# Patient Record
Sex: FEMALE | Race: WHITE | NOT HISPANIC OR LATINO | Employment: FULL TIME | URBAN - METROPOLITAN AREA
[De-identification: names, ages, dates, MRNs, and addresses within clinical notes are randomized per-mention and may not be internally consistent; named-entity substitution may affect disease eponyms.]

---

## 2019-10-17 DIAGNOSIS — R51.9 NONINTRACTABLE HEADACHE, UNSPECIFIED CHRONICITY PATTERN, UNSPECIFIED HEADACHE TYPE: Primary | ICD-10-CM

## 2019-10-17 RX ORDER — KETOROLAC TROMETHAMINE 15.75 MG/1
15.75 SPRAY, METERED NASAL EVERY 6 HOURS
Qty: 5 EACH | Refills: 0 | Status: SHIPPED | OUTPATIENT
Start: 2019-10-17 | End: 2019-12-31

## 2019-12-31 ENCOUNTER — OFFICE VISIT (OUTPATIENT)
Dept: FAMILY MEDICINE | Facility: CLINIC | Age: 39
End: 2019-12-31
Payer: COMMERCIAL

## 2019-12-31 VITALS
SYSTOLIC BLOOD PRESSURE: 118 MMHG | HEIGHT: 67 IN | OXYGEN SATURATION: 99 % | HEART RATE: 74 BPM | BODY MASS INDEX: 25.25 KG/M2 | WEIGHT: 160.88 LBS | DIASTOLIC BLOOD PRESSURE: 78 MMHG

## 2019-12-31 DIAGNOSIS — Z00.00 PREVENTATIVE HEALTH CARE: Primary | ICD-10-CM

## 2019-12-31 PROCEDURE — 99385 PREV VISIT NEW AGE 18-39: CPT | Mod: S$GLB,,, | Performed by: FAMILY MEDICINE

## 2019-12-31 PROCEDURE — 99385 PR PREVENTIVE VISIT,NEW,18-39: ICD-10-PCS | Mod: S$GLB,,, | Performed by: FAMILY MEDICINE

## 2019-12-31 RX ORDER — BUPROPION HYDROCHLORIDE 150 MG/1
150 TABLET ORAL DAILY
Qty: 30 TABLET | Refills: 1 | Status: SHIPPED | OUTPATIENT
Start: 2019-12-31 | End: 2020-01-23

## 2019-12-31 NOTE — PROGRESS NOTES
Subjective:       Patient ID: Ammy Zamora is a 39 y.o. female.    Chief Complaint: Establish Care (Wellness)    HPI     Here to est care and for wellness. utd pap and vacs. No complaints at this time. Needs annual labs.     Review of Systems   Constitutional: Negative for chills, fatigue and fever.   Eyes: Negative for visual disturbance.   Respiratory: Negative for cough and shortness of breath.    Cardiovascular: Negative for chest pain and palpitations.   Gastrointestinal: Negative for abdominal pain, nausea and vomiting.   Genitourinary: Negative for dysuria and frequency.   Musculoskeletal: Negative for arthralgias and myalgias.   Skin: Negative for rash.   Neurological: Negative for dizziness, weakness, light-headedness and headaches.       Objective:      Physical Exam   Constitutional: She is oriented to person, place, and time. She appears well-developed and well-nourished. No distress.   HENT:   Head: Normocephalic and atraumatic.   Eyes: Conjunctivae are normal.   Neck: Normal range of motion. Neck supple.   Cardiovascular: Normal rate, regular rhythm and normal heart sounds. Exam reveals no gallop and no friction rub.   No murmur heard.  Pulmonary/Chest: Effort normal and breath sounds normal. No stridor. No respiratory distress. She has no wheezes.   Musculoskeletal: She exhibits no edema.   Neurological: She is alert and oriented to person, place, and time.   Skin: Skin is warm and dry. She is not diaphoretic. No pallor.   Psychiatric: She has a normal mood and affect. Her behavior is normal.   Vitals reviewed.      Assessment:       1. Preventative health care        Plan:       Ammy was seen today for [facility].    Diagnoses and all orders for this visit:    Preventative health care  -     CBC auto differential; Future  -     Comprehensive metabolic panel; Future  -     TSH; Future  -     CBC auto differential  -     Comprehensive metabolic panel  -     TSH    Other orders  -      buPROPion (WELLBUTRIN XL) 150 MG TB24 tablet; Take 1 tablet (150 mg total) by mouth once daily.

## 2020-01-23 DIAGNOSIS — Z00.00 PREVENTATIVE HEALTH CARE: Primary | ICD-10-CM

## 2020-01-23 DIAGNOSIS — N97.9 INFERTILITY, FEMALE: ICD-10-CM

## 2020-01-23 RX ORDER — BUPROPION HYDROCHLORIDE 300 MG/1
300 TABLET ORAL DAILY
Qty: 30 TABLET | Refills: 2 | Status: SHIPPED | OUTPATIENT
Start: 2020-01-23 | End: 2020-04-14

## 2020-01-23 RX ORDER — BUPROPION HYDROCHLORIDE 150 MG/1
TABLET ORAL
Qty: 30 TABLET | Refills: 1 | OUTPATIENT
Start: 2020-01-23

## 2020-01-23 RX ORDER — LETROZOLE 2.5 MG/1
TABLET, FILM COATED ORAL
Qty: 10 TABLET | Refills: 0 | Status: SHIPPED | OUTPATIENT
Start: 2020-01-23 | End: 2020-03-18 | Stop reason: ALTCHOICE

## 2020-02-14 DIAGNOSIS — N97.9 INFERTILITY, FEMALE: ICD-10-CM

## 2020-02-14 RX ORDER — LETROZOLE 2.5 MG/1
TABLET, FILM COATED ORAL
Refills: 0 | OUTPATIENT
Start: 2020-02-14

## 2020-03-18 RX ORDER — AZITHROMYCIN 250 MG/1
250 TABLET, FILM COATED ORAL DAILY
Qty: 5 TABLET | Refills: 0 | Status: SHIPPED | OUTPATIENT
Start: 2020-03-18 | End: 2020-03-23

## 2020-03-18 RX ORDER — HYDROXYCHLOROQUINE SULFATE 200 MG/1
200 TABLET, FILM COATED ORAL 2 TIMES DAILY
Qty: 60 TABLET | Refills: 0 | Status: SHIPPED | OUTPATIENT
Start: 2020-03-18 | End: 2020-04-17

## 2020-04-13 RX ORDER — HYDROXYCHLOROQUINE SULFATE 200 MG/1
TABLET, FILM COATED ORAL
Qty: 60 TABLET | Refills: 0 | OUTPATIENT
Start: 2020-04-13

## 2020-04-14 DIAGNOSIS — Z00.00 PREVENTATIVE HEALTH CARE: ICD-10-CM

## 2020-04-14 RX ORDER — BUPROPION HYDROCHLORIDE 300 MG/1
TABLET ORAL
Qty: 90 TABLET | Refills: 0 | Status: SHIPPED | OUTPATIENT
Start: 2020-04-14 | End: 2021-05-10

## 2020-04-21 DIAGNOSIS — Z01.84 ANTIBODY RESPONSE EXAMINATION: ICD-10-CM

## 2020-04-23 DIAGNOSIS — Z34.90 EARLY STAGE OF PREGNANCY: Primary | ICD-10-CM

## 2020-04-23 LAB — B-HCG SERPL-ACNC: 3623 MIU/ML

## 2020-04-27 DIAGNOSIS — Z34.90 EARLY STAGE OF PREGNANCY: Primary | ICD-10-CM

## 2020-04-27 RX ORDER — PROGESTERONE 200 MG/1
200 CAPSULE ORAL NIGHTLY
Qty: 12 CAPSULE | Refills: 11 | Status: SHIPPED | OUTPATIENT
Start: 2020-04-27 | End: 2021-05-10

## 2020-04-30 ENCOUNTER — LAB VISIT (OUTPATIENT)
Dept: FAMILY MEDICINE | Facility: CLINIC | Age: 40
End: 2020-04-30
Payer: COMMERCIAL

## 2020-04-30 DIAGNOSIS — Z01.84 ANTIBODY RESPONSE EXAMINATION: ICD-10-CM

## 2020-04-30 PROCEDURE — 86769 SARS-COV-2 COVID-19 ANTIBODY: CPT

## 2020-05-01 LAB — SARS-COV-2 IGG SERPLBLD QL IA.RAPID: NEGATIVE

## 2020-05-05 ENCOUNTER — PROCEDURE VISIT (OUTPATIENT)
Dept: OBSTETRICS AND GYNECOLOGY | Facility: CLINIC | Age: 40
End: 2020-05-05
Payer: COMMERCIAL

## 2020-05-05 DIAGNOSIS — Z34.81 ENCOUNTER FOR SUPERVISION OF NORMAL PREGNANCY IN MULTIGRAVIDA IN FIRST TRIMESTER: ICD-10-CM

## 2020-05-05 DIAGNOSIS — Z34.81 ENCOUNTER FOR SUPERVISION OF NORMAL PREGNANCY IN MULTIGRAVIDA IN FIRST TRIMESTER: Primary | ICD-10-CM

## 2020-05-05 PROCEDURE — 76817 PR US, OB, TRANSVAG APPROACH: ICD-10-PCS | Mod: S$GLB,,, | Performed by: OBSTETRICS & GYNECOLOGY

## 2020-05-05 PROCEDURE — 76817 TRANSVAGINAL US OBSTETRIC: CPT | Mod: S$GLB,,, | Performed by: OBSTETRICS & GYNECOLOGY

## 2020-05-11 RX ORDER — HYDROXYCHLOROQUINE SULFATE 200 MG/1
TABLET, FILM COATED ORAL
Qty: 60 TABLET | Refills: 0 | OUTPATIENT
Start: 2020-05-11

## 2020-05-12 LAB
ABS NRBC COUNT: 0 X 10 3/UL (ref 0–0.01)
ABSOLUTE BASOPHIL: 0.01 X 10 3/UL (ref 0–0.22)
ABSOLUTE EOSINOPHIL: 0.08 X 10 3/UL (ref 0.04–0.54)
ABSOLUTE IMMATURE GRAN: 0.02 X 10 3/UL (ref 0–0.04)
ABSOLUTE LYMPHOCYTE: 1.47 X 10 3/UL (ref 0.86–4.75)
ABSOLUTE MONOCYTE: 0.32 X 10 3/UL (ref 0.22–1.08)
ANTIBODY SCREEN: NEGATIVE
BASOPHILS NFR BLD: 0.2 % (ref 0.2–1.2)
BLOOD GROUPING: NORMAL
BLOOD TYPE (D): POSITIVE
EOSINOPHIL NFR BLD: 1.5 % (ref 0.7–7)
HBV SURFACE AG SERPL QL IA: NONREACTIVE
HCT VFR BLD AUTO: 35 % (ref 37–47)
HGB BLD-MCNC: 11.8 G/DL (ref 12–16)
HIV 1+2 AB+HIV1 P24 AG SERPL QL IA: NONREACTIVE
IMMATURE GRANULOCYTES: 0.4 % (ref 0–0.5)
LYMPHOCYTES NFR BLD: 26.8 % (ref 19.3–53.1)
MCH RBC QN AUTO: 31.2 PG (ref 27–32)
MCHC RBC AUTO-ENTMCNC: 33.7 G/DL (ref 32–36)
MCV RBC AUTO: 92.6 FL (ref 82–100)
MONOCYTES NFR BLD: 5.8 % (ref 4.7–12.5)
NEUTROPHILS ABSOLUTE COUNT: 3.59 X 10 3/UL (ref 2.15–7.56)
NEUTROPHILS NFR BLD: 65.3 %
NUCLEATED RED BLOOD CELLS: 0 /100 WBC (ref 0–0.2)
PLATELET # BLD AUTO: 194 X 10 3/UL (ref 135–400)
RBC # BLD AUTO: 3.78 X 10 6/UL (ref 4.2–5.4)
RDW-SD: 40.2 FL (ref 37–54)
RUBELLA IGG SCREEN: NORMAL
SICKLE CELL PREP: NEGATIVE
SYPHILIS TREPONEMAL ANTIBODY: NONREACTIVE
WBC # BLD: 5.49 X 10 3/UL (ref 4.3–10.8)

## 2020-06-01 ENCOUNTER — ROUTINE PRENATAL (OUTPATIENT)
Dept: OBSTETRICS AND GYNECOLOGY | Facility: CLINIC | Age: 40
End: 2020-06-01
Payer: COMMERCIAL

## 2020-06-01 VITALS
DIASTOLIC BLOOD PRESSURE: 79 MMHG | HEART RATE: 90 BPM | WEIGHT: 160 LBS | BODY MASS INDEX: 25.06 KG/M2 | SYSTOLIC BLOOD PRESSURE: 128 MMHG

## 2020-06-01 DIAGNOSIS — Z34.01 ENCOUNTER FOR SUPERVISION OF NORMAL FIRST PREGNANCY IN FIRST TRIMESTER: Primary | ICD-10-CM

## 2020-06-01 DIAGNOSIS — O09.521 MULTIGRAVIDA OF ADVANCED MATERNAL AGE IN FIRST TRIMESTER: ICD-10-CM

## 2020-06-01 DIAGNOSIS — Z34.81 ENCOUNTER FOR SUPERVISION OF NORMAL PREGNANCY IN MULTIGRAVIDA IN FIRST TRIMESTER: ICD-10-CM

## 2020-06-01 PROCEDURE — 0500F INITIAL PRENATAL CARE VISIT: CPT | Mod: S$GLB,,, | Performed by: OBSTETRICS & GYNECOLOGY

## 2020-06-01 PROCEDURE — 0500F PR INITIAL PRENATAL CARE VISIT: ICD-10-PCS | Mod: S$GLB,,, | Performed by: OBSTETRICS & GYNECOLOGY

## 2020-06-01 NOTE — PROGRESS NOTES
Subjective:       Patient ID: Ammy Zamora is a 39 y.o.  at 10w3d     Chief Complaint:  Routine Prenatal Visit      History of Present Illness  No complaints. Reports normal sx. Labs and history reviewed with pt.         Review of Systems  Denies n/v, f/c, dysuria, contractions,   VD, VB, round ligament pain, headaches       Objective:     Vitals:    20 0802   BP: 128/79   Pulse: 90     Wt Readings from Last 3 Encounters:   20 72.6 kg (160 lb)   19 73 kg (160 lb 14.4 oz)     nad  NCAT  pupils normal size  Skin nml no rashes or lesions  No resp distress, resp even and unlabored   nt, nd,  no rebound no guarding  No cyanosis or clubbing, edema appropriate for pregn  Normal external female genitalia  Normal cervix    FHT: nml on US  Assessment:        1. Encounter for supervision of normal first pregnancy in first trimester    2. Encounter for supervision of normal pregnancy in multigravida in first trimester    3. Multigravida of advanced maternal age in first trimester                Plan:    pap  Urine culture  gc cz  cfDNA  Encouraged PNV  Pain, fever, bleeding precautions   RTC 4 weeks

## 2020-06-02 LAB
CHLAMYDIA: NEGATIVE
GONORRHEA: NEGATIVE
SOURCE: NORMAL
SOURCE: NORMAL
TRICHOMONAS AMPLIFIED: NEGATIVE

## 2020-06-03 LAB — URINE CULTURE, ROUTINE: NORMAL

## 2020-07-02 ENCOUNTER — ROUTINE PRENATAL (OUTPATIENT)
Dept: OBSTETRICS AND GYNECOLOGY | Facility: CLINIC | Age: 40
End: 2020-07-02
Payer: COMMERCIAL

## 2020-07-02 VITALS
DIASTOLIC BLOOD PRESSURE: 74 MMHG | HEART RATE: 91 BPM | SYSTOLIC BLOOD PRESSURE: 126 MMHG | WEIGHT: 160 LBS | BODY MASS INDEX: 25.06 KG/M2

## 2020-07-02 DIAGNOSIS — Z34.02 ENCOUNTER FOR SUPERVISION OF NORMAL FIRST PREGNANCY IN SECOND TRIMESTER: Primary | ICD-10-CM

## 2020-07-02 DIAGNOSIS — O09.512 ADVANCED MATERNAL AGE, PRIMIGRAVIDA IN SECOND TRIMESTER, ANTEPARTUM: ICD-10-CM

## 2020-07-02 PROCEDURE — 0502F SUBSEQUENT PRENATAL CARE: CPT | Mod: CPTII,S$GLB,, | Performed by: OBSTETRICS & GYNECOLOGY

## 2020-07-02 PROCEDURE — 0502F PR SUBSEQUENT PRENATAL CARE: ICD-10-PCS | Mod: CPTII,S$GLB,, | Performed by: OBSTETRICS & GYNECOLOGY

## 2020-07-02 NOTE — PROGRESS NOTES
Subjective:       Patient ID: Ammy Zamora is a 39 y.o.  at 14w6d      Chief Complaint:  Routine Prenatal Visit      History of Present Illness  No complaints. Reports normal sx. Labs and history reviewed with pt.       Review of Systems  Denies n/v, f/c, dysuria, contractions,   VD, VB, round ligament pain, headaches       Objective:     Vitals:    20 0809   BP: 126/74   Pulse: 91     Wt Readings from Last 3 Encounters:   20 72.6 kg (160 lb)   20 72.6 kg (160 lb)   19 73 kg (160 lb 14.4 oz)        nad  NCAT  pupils normal size  Skin nml no rashes or lesions  No resp distress, resp even and unlabored  Gravid nt, no rebound no guarding  No cyanosis or clubbing, edema appropriate for pregn    FHT: 140's       Assessment:        1. Encounter for supervision of normal first pregnancy in second trimester    2. Advanced maternal age, primigravida in second trimester, antepartum                Plan:          [place]  Encouraged PNV  Pain, fever, bleeding precautions   RTC 3 weeks

## 2020-07-21 DIAGNOSIS — O09.512 PRIMIGRAVIDA OF ADVANCED MATERNAL AGE IN SECOND TRIMESTER: Primary | ICD-10-CM

## 2020-07-27 ENCOUNTER — INITIAL CONSULT (OUTPATIENT)
Dept: MATERNAL FETAL MEDICINE | Facility: CLINIC | Age: 40
End: 2020-07-27
Payer: COMMERCIAL

## 2020-07-27 ENCOUNTER — ROUTINE PRENATAL (OUTPATIENT)
Dept: OBSTETRICS AND GYNECOLOGY | Facility: CLINIC | Age: 40
End: 2020-07-27
Payer: COMMERCIAL

## 2020-07-27 VITALS
BODY MASS INDEX: 25.9 KG/M2 | DIASTOLIC BLOOD PRESSURE: 68 MMHG | HEIGHT: 67 IN | DIASTOLIC BLOOD PRESSURE: 74 MMHG | SYSTOLIC BLOOD PRESSURE: 110 MMHG | OXYGEN SATURATION: 98 % | WEIGHT: 165 LBS | SYSTOLIC BLOOD PRESSURE: 110 MMHG | RESPIRATION RATE: 20 BRPM | BODY MASS INDEX: 25.84 KG/M2 | HEART RATE: 94 BPM | WEIGHT: 165 LBS | HEART RATE: 79 BPM

## 2020-07-27 DIAGNOSIS — O09.512 ADVANCED MATERNAL AGE, PRIMIGRAVIDA IN SECOND TRIMESTER, ANTEPARTUM: ICD-10-CM

## 2020-07-27 DIAGNOSIS — O09.512 PRIMIGRAVIDA OF ADVANCED MATERNAL AGE IN SECOND TRIMESTER: ICD-10-CM

## 2020-07-27 DIAGNOSIS — Z34.02 ENCOUNTER FOR SUPERVISION OF NORMAL FIRST PREGNANCY IN SECOND TRIMESTER: Primary | ICD-10-CM

## 2020-07-27 PROCEDURE — 3008F PR BODY MASS INDEX (BMI) DOCUMENTED: ICD-10-PCS | Mod: CPTII,S$GLB,, | Performed by: OBSTETRICS & GYNECOLOGY

## 2020-07-27 PROCEDURE — 99203 PR OFFICE/OUTPT VISIT, NEW, LEVL III, 30-44 MIN: ICD-10-PCS | Mod: 25,S$GLB,, | Performed by: OBSTETRICS & GYNECOLOGY

## 2020-07-27 PROCEDURE — 76811 OB US DETAILED SNGL FETUS: CPT | Mod: S$GLB,,, | Performed by: OBSTETRICS & GYNECOLOGY

## 2020-07-27 PROCEDURE — 0502F PR SUBSEQUENT PRENATAL CARE: ICD-10-PCS | Mod: CPTII,S$GLB,, | Performed by: OBSTETRICS & GYNECOLOGY

## 2020-07-27 PROCEDURE — 99203 OFFICE O/P NEW LOW 30 MIN: CPT | Mod: 25,S$GLB,, | Performed by: OBSTETRICS & GYNECOLOGY

## 2020-07-27 PROCEDURE — 76811 PR US, OB FETAL EVAL & EXAM, TRANSABDOM,FIRST GESTATION: ICD-10-PCS | Mod: S$GLB,,, | Performed by: OBSTETRICS & GYNECOLOGY

## 2020-07-27 PROCEDURE — 0502F SUBSEQUENT PRENATAL CARE: CPT | Mod: CPTII,S$GLB,, | Performed by: OBSTETRICS & GYNECOLOGY

## 2020-07-27 PROCEDURE — 3008F BODY MASS INDEX DOCD: CPT | Mod: CPTII,S$GLB,, | Performed by: OBSTETRICS & GYNECOLOGY

## 2020-07-27 NOTE — PROGRESS NOTES
Indication for consultation:  Advanced maternal age with negative free fetal DNA screening    Provider requesting consultation:  Dr. Kaplan    Dear Dr. Kaplan    I had the pleasure of seeing Ammy Zamora for initial consultation today via telemedicine.  As you recall she is a 39 y.o.  at 18w3d here for consultation regarding advanced maternal age.  Of note the patient has completed free fetal DNA screening at notes a low risk of trisomy 13, 18 and 21.    PMH:  The patient has a history of mild anxiety which she states predominantly revolved around her anxiety regarding pregnancy conception.  Prior to pregnancy she was on Wellbutrin however this was discontinued once she achieved pregnancy.    Ob Hx:  This is the patient's 3rd pregnancy.  She has 2 prior first-trimester pregnancy losses.  The 2nd pregnancy loss was noted to have trisomy 18.  The patient has completed free fetal DNA screening this pregnancy denotes a low risk of trisomy 13, 18 and 21.    PSH:  Prior history of dilation curettage.  Prior history of appendectomy.    SOC:  She denies any tobacco, alcohol or recreational drug use.  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    Medications:  Prenatal vitamin each day.  Low-dose aspirin each day.    Family hx:  She denies any family history of congenital anomalies.  She denies any family history of genetic abnormalities.  Her last pregnancy loss was secondary to trisomy 18.    Allergies:  She is allergic penicillin    Review of systems: The patient denies any vaginal bleeding, loss of fluid or contraction pain today.  Vitals:    20 1249   BP: 110/74   Pulse: 94   Resp: 20       Physical exam:  Gen: WDWN in NAD  HEENT: WNL  Abdomen: Soft, non-tender  Skin: No rash or jaundice  Extremities: No clubbing or cyanosis  Neuro: Grossly intact    Ultrasound:  A detailed fetal anatomical survey was performed today.  There is a single intrauterine pregnancy in the breech  presentation.  Structurally the fetus does not show any evidence of aneuploidy.  Fetal growth is reassuring.  The amniotic fluid volume is normal.  Placenta is anterior with no evidence of previa.  The cervix appears to be of normal length transabdominally.  Please see official report for full specifics.    Recommendations:  Today I discussed with the patient the increased risk of fetal aneuploidy with advancing maternal age.  I reviewed with the patient her age-related risk of fetal aneuploidy including Down syndrome risk 1 in 150.  I discussed with the patient the results of her free fetal DNA screening, including a sensitivity and negative predictive value of this test.  I reviewed with the patient the limitations of ultrasound in the diagnosis of fetal aneuploidy.  I discussed with the patient the definitive diagnostic option of amniocentesis.  Since the patient's ultrasound appeared normal today, and she has negative free fetal DNA screening, I recommended against amniocentesis.    I briefly discussed with the patient her risk associated with her employment as a nurse practitioner.  I counseled the patient on the risk of such conditions as cytomegalovirus.  I recommended that once available she should receive the influenza vaccine later this year.    From a followup standpoint, I did not make any future appointments since her ultrasound appears normal and her free fetal DNA screening is negative.  If new problems arise that you would need further consultation then by means give me a call and we will see her immediately.    Thanks once again for allowing us to participate in the care of your patients.  If you have any questions about today's consultation feel free to contact me or my partners at [phone].    Sincerely,

## 2020-07-27 NOTE — PROGRESS NOTES
"Ammy is here for initial MFM consultation, referred by Dr Kaplan for AMA with negative NIPT.    She is not feeling fetal movement.    Ammy denies vaginal bleeding, loss of fluid, recurrent cramping.      Vitals:    07/27/20 1249   BP: 110/74   Pulse: 94   Resp: 20   SpO2: 98%   Weight: 74.8 kg (165 lb)   Height: 5' 7" (1.702 m)      BMI:                    25.84 kg/m^2             "

## 2020-07-27 NOTE — PROGRESS NOTES
Subjective:       Patient ID: Ammy Zamora is a 39 y.o.  at 18w3d      Chief Complaint:  Routine Prenatal Visit      History of Present Illness  No complaints. Reports normal sx. Labs and history reviewed with pt.     Saw MFM today patient's anatomy was cleared had no follow-up schedule  Review of Systems  Denies n/v, f/c, dysuria, contractions,   VD, VB, round ligament pain, headaches       Objective:     Vitals:    20 1257   BP: 110/68   Pulse: 79     Wt Readings from Last 3 Encounters:   20 74.8 kg (165 lb)   20 74.8 kg (165 lb)   20 72.6 kg (160 lb)        nad  NCAT  pupils normal size  Skin nml no rashes or lesions  No resp distress, resp even and unlabored  Gravid nt, no rebound no guarding  No cyanosis or clubbing, edema appropriate for pregn           Assessment:        1. Encounter for supervision of normal first pregnancy in second trimester    2. Advanced maternal age, primigravida in second trimester, antepartum                Plan:        Encouraged PNV  Pain, fever, bleeding precautions   RTC 4 weeks

## 2020-08-25 ENCOUNTER — ROUTINE PRENATAL (OUTPATIENT)
Dept: OBSTETRICS AND GYNECOLOGY | Facility: CLINIC | Age: 40
End: 2020-08-25
Payer: COMMERCIAL

## 2020-08-25 VITALS
BODY MASS INDEX: 26.47 KG/M2 | SYSTOLIC BLOOD PRESSURE: 121 MMHG | HEART RATE: 79 BPM | WEIGHT: 169 LBS | DIASTOLIC BLOOD PRESSURE: 81 MMHG

## 2020-08-25 DIAGNOSIS — Z34.02 ENCOUNTER FOR SUPERVISION OF NORMAL FIRST PREGNANCY IN SECOND TRIMESTER: Primary | ICD-10-CM

## 2020-08-25 DIAGNOSIS — O09.512 ADVANCED MATERNAL AGE, PRIMIGRAVIDA IN SECOND TRIMESTER, ANTEPARTUM: ICD-10-CM

## 2020-08-25 PROCEDURE — 0502F PR SUBSEQUENT PRENATAL CARE: ICD-10-PCS | Mod: CPTII,S$GLB,, | Performed by: OBSTETRICS & GYNECOLOGY

## 2020-08-25 PROCEDURE — 0502F SUBSEQUENT PRENATAL CARE: CPT | Mod: CPTII,S$GLB,, | Performed by: OBSTETRICS & GYNECOLOGY

## 2020-08-25 NOTE — PROGRESS NOTES
Subjective:       Patient ID: Ammy Zamora is a 39 y.o.  at 22w4d     Chief Complaint:  Routine Prenatal Visit      History of Present Illness  No complaints. Reports normal sx. Labs and history reviewed with pt.         Review of Systems  Denies n/v, f/c, dysuria, contractions,   VD, VB, round ligament pain, headaches, preE ROS       Objective:     Vitals:    20 0803   BP: 121/81   Pulse: 79     Wt Readings from Last 3 Encounters:   20 76.7 kg (169 lb)   20 74.8 kg (165 lb)   20 74.8 kg (165 lb)       nad  NCAT  pupils normal size  Skin nml no rashes or lesions  No resp distress, resp even and unlabored  Gravid nt, no rebound no guarding  No cyanosis or clubbing, edema appropriate for pregn    FHT: 140's      Assessment:        1. Encounter for supervision of normal first pregnancy in second trimester    2. Advanced maternal age, primigravida in second trimester, antepartum                Plan:      Os before next visit  Encouraged PNV  Pain, fever, bleeding precautions   RTc 3 weeks

## 2020-09-11 LAB — GLUCOSE 1 HR POST 50 GM: 142 MG/DL

## 2020-09-15 LAB
ALBUMIN SERPL BCP-MCNC: 2.8 G/DL (ref 3.4–5)
ALBUMIN/GLOBULIN RATIO: 0.93 RATIO (ref 1.1–1.8)
ALP SERPL-CCNC: 35 U/L (ref 46–116)
ALT SERPL W P-5'-P-CCNC: 19 U/L (ref 12–78)
ANION GAP SERPL CALC-SCNC: 12 MMOL/L (ref 3–11)
AST SERPL-CCNC: 16 U/L (ref 15–37)
BASOPHILS NFR SNV MANUAL: 0.2 % (ref 0–3)
BILIRUB SERPL-MCNC: 0.2 MG/DL (ref 0–1)
BUN SERPL-MCNC: 11 MG/DL (ref 7–18)
BUN/CREAT SERPL: 16.41 RATIO (ref 7–18)
CALCIUM SERPL-MCNC: 8.3 MG/DL (ref 8.8–10.5)
CHLORIDE SERPL-SCNC: 102 MMOL/L (ref 100–108)
CO2 SERPL-SCNC: 23 MMOL/L (ref 21–32)
CORTISOL,SERUM: 35.3 UG/DL
CREAT SERPL-MCNC: 0.67 MG/DL (ref 0.55–1.02)
EOSINOPHIL NFR SNV MANUAL: 0.3 % (ref 1–3)
ERYTHROCYTE [DISTWIDTH] IN BLOOD BY AUTOMATED COUNT: 12.5 % (ref 12.5–18)
GFR ESTIMATION: > 60
GLOBULIN: 3 G/DL (ref 2.3–3.5)
GLUCOSE SERPL-MCNC: 93 MG/DL (ref 70–110)
HCT VFR BLD AUTO: 31.6 % (ref 37–47)
HGB BLD-MCNC: 10.5 G/DL (ref 12–16)
LYMPHOCYTES NFR SNV MANUAL: 13.4 % (ref 25–40)
MANUAL NRBC PER 100 CELLS: 0 %
MCH RBC QN AUTO: 31.7 PG (ref 27–31.2)
MCHC RBC AUTO-ENTMCNC: 33.2 G/DL (ref 31.8–35.4)
MCV RBC AUTO: 95.5 FL (ref 80–97)
MONOCYTES/100 LEUKOCYTES: 4.5 % (ref 1–15)
NEUTROPHILS NFR BLD: 4.72 10*3/UL (ref 1.8–7.7)
NEUTROPHILS NFR SNV MANUAL: 81.3 % (ref 37–80)
PLATELETS: 140 10*3/UL (ref 142–424)
POTASSIUM SERPL-SCNC: 4.4 MMOL/L (ref 3.6–5.2)
PROT SERPL-MCNC: 5.8 G/DL (ref 6.4–8.2)
RBC # BLD AUTO: 3.31 10*6/UL (ref 4.2–5.4)
SODIUM BLD-SCNC: 137 MMOL/L (ref 135–145)
TSH SERPL DL<=0.005 MIU/L-ACNC: 1.12 UIU/ML (ref 0.36–3.74)
WBC # BLD: 5.8 10*3/UL (ref 4.6–10.2)

## 2020-09-28 RX ORDER — TRIAMCINOLONE ACETONIDE 1 MG/G
1 CREAM TOPICAL 2 TIMES DAILY
Qty: 80 G | Refills: 0 | Status: SHIPPED | OUTPATIENT
Start: 2020-09-28 | End: 2021-05-10

## 2020-10-07 ENCOUNTER — ROUTINE PRENATAL (OUTPATIENT)
Dept: OBSTETRICS AND GYNECOLOGY | Facility: CLINIC | Age: 40
End: 2020-10-07
Payer: COMMERCIAL

## 2020-10-07 VITALS
HEART RATE: 80 BPM | WEIGHT: 173 LBS | SYSTOLIC BLOOD PRESSURE: 122 MMHG | DIASTOLIC BLOOD PRESSURE: 77 MMHG | BODY MASS INDEX: 27.1 KG/M2

## 2020-10-07 DIAGNOSIS — O09.523 ADVANCED MATERNAL AGE IN MULTIGRAVIDA, THIRD TRIMESTER: ICD-10-CM

## 2020-10-07 DIAGNOSIS — Z34.03 ENCOUNTER FOR SUPERVISION OF NORMAL FIRST PREGNANCY IN THIRD TRIMESTER: Primary | ICD-10-CM

## 2020-10-07 PROCEDURE — 0502F PR SUBSEQUENT PRENATAL CARE: ICD-10-PCS | Mod: CPTII,S$GLB,, | Performed by: OBSTETRICS & GYNECOLOGY

## 2020-10-07 PROCEDURE — 0502F SUBSEQUENT PRENATAL CARE: CPT | Mod: CPTII,S$GLB,, | Performed by: OBSTETRICS & GYNECOLOGY

## 2020-10-07 NOTE — PROGRESS NOTES
Subjective:       Patient ID: Ammy Zamora is a 40 y.o.  at 28w5d     Chief Complaint:  Routine Prenatal Visit      History of Present Illness  No complaints. Reports normal sx. Labs and history reviewed with pt.     Failed OS    Saw cards- no new recs- probably vasovagal episodes  Review of Systems  Denies n/v, f/c, dysuria, contractions,   VD, VB, round ligament pain, headaches, preE ROS       Objective:     Vitals:    10/07/20 1132   BP: 122/77   Pulse: 80     Wt Readings from Last 3 Encounters:   10/07/20 78.5 kg (173 lb)   20 76.7 kg (169 lb)   20 74.8 kg (165 lb)       nad  NCAT  pupils normal size  Skin nml no rashes or lesions  No resp distress, resp even and unlabored  Gravid nt, no rebound no guarding  No cyanosis or clubbing, edema appropriate for pregn    FHT:140      Assessment:        1. Encounter for supervision of normal first pregnancy in third trimester    2. Advanced maternal age in multigravida, third trimester                Plan:      3 hr gtt  Encouraged PNV  Pain, fever, bleeding precautions   RTC 3 weeks

## 2020-10-27 ENCOUNTER — ROUTINE PRENATAL (OUTPATIENT)
Dept: OBSTETRICS AND GYNECOLOGY | Facility: CLINIC | Age: 40
End: 2020-10-27
Payer: COMMERCIAL

## 2020-10-27 VITALS
WEIGHT: 174 LBS | BODY MASS INDEX: 27.25 KG/M2 | SYSTOLIC BLOOD PRESSURE: 113 MMHG | DIASTOLIC BLOOD PRESSURE: 76 MMHG | HEART RATE: 146 BPM

## 2020-10-27 DIAGNOSIS — Z34.03 ENCOUNTER FOR SUPERVISION OF NORMAL FIRST PREGNANCY IN THIRD TRIMESTER: Primary | ICD-10-CM

## 2020-10-27 DIAGNOSIS — O09.523 ADVANCED MATERNAL AGE IN MULTIGRAVIDA, THIRD TRIMESTER: ICD-10-CM

## 2020-10-27 LAB
HCT VFR BLD AUTO: 31.6 % (ref 37–47)
HGB BLD-MCNC: 10.7 G/DL (ref 12–16)

## 2020-10-27 PROCEDURE — 90471 IMMUNIZATION ADMIN: CPT | Mod: S$GLB,,, | Performed by: OBSTETRICS & GYNECOLOGY

## 2020-10-27 PROCEDURE — 0502F PR SUBSEQUENT PRENATAL CARE: ICD-10-PCS | Mod: CPTII,S$GLB,, | Performed by: OBSTETRICS & GYNECOLOGY

## 2020-10-27 PROCEDURE — 90715 TDAP VACCINE 7 YRS/> IM: CPT | Mod: S$GLB,,, | Performed by: OBSTETRICS & GYNECOLOGY

## 2020-10-27 PROCEDURE — 90715 TDAP VACCINE GREATER THAN OR EQUAL TO 7YO IM: ICD-10-PCS | Mod: S$GLB,,, | Performed by: OBSTETRICS & GYNECOLOGY

## 2020-10-27 PROCEDURE — 0502F SUBSEQUENT PRENATAL CARE: CPT | Mod: CPTII,S$GLB,, | Performed by: OBSTETRICS & GYNECOLOGY

## 2020-10-27 PROCEDURE — 90471 TDAP VACCINE GREATER THAN OR EQUAL TO 7YO IM: ICD-10-PCS | Mod: S$GLB,,, | Performed by: OBSTETRICS & GYNECOLOGY

## 2020-10-27 NOTE — PROGRESS NOTES
Subjective:       Patient ID: Ammy Zamora is a 40 y.o.  at 31w4d     Chief Complaint:  Routine Prenatal Visit      History of Present Illness  Pt tachycardic today.  Has been seen by cards with nml wkup    . Reports normal sx. Labs and history reviewed with pt.         Review of Systems  Denies n/v, f/c, dysuria, contractions,   VD, VB, round ligament pain, headaches, preE ROS       Objective:     Vitals:    10/27/20 0803   BP: 113/76   Pulse: (!) 146     Wt Readings from Last 3 Encounters:   10/27/20 78.9 kg (174 lb)   10/07/20 78.5 kg (173 lb)   20 76.7 kg (169 lb)       nad  NCAT  pupils normal size  Skin nml no rashes or lesions  No resp distress, resp even and unlabored  Gravid nt, no rebound no guarding  No cyanosis or clubbing, edema appropriate for pregn    FHT: 140's      Assessment:        1. Encounter for supervision of normal first pregnancy in third trimester    2. Advanced maternal age in multigravida, third trimester                Plan:      tdap  3rd tri labs   Encouraged PNV  Pain, fever, bleeding precautions   RTC 1 weeks

## 2020-11-03 ENCOUNTER — PROCEDURE VISIT (OUTPATIENT)
Dept: OBSTETRICS AND GYNECOLOGY | Facility: CLINIC | Age: 40
End: 2020-11-03
Payer: COMMERCIAL

## 2020-11-03 DIAGNOSIS — Z34.03 ENCOUNTER FOR SUPERVISION OF NORMAL FIRST PREGNANCY IN THIRD TRIMESTER: ICD-10-CM

## 2020-11-03 DIAGNOSIS — Z34.03 ENCOUNTER FOR SUPERVISION OF NORMAL FIRST PREGNANCY IN THIRD TRIMESTER: Primary | ICD-10-CM

## 2020-11-03 PROCEDURE — 76815 OB US LIMITED FETUS(S): CPT | Mod: S$GLB,,, | Performed by: OBSTETRICS & GYNECOLOGY

## 2020-11-03 PROCEDURE — 76815 PR  US,PREGNANT UTERUS,LIMITED, 1/> FETUSES: ICD-10-PCS | Mod: S$GLB,,, | Performed by: OBSTETRICS & GYNECOLOGY

## 2020-11-12 ENCOUNTER — ROUTINE PRENATAL (OUTPATIENT)
Dept: OBSTETRICS AND GYNECOLOGY | Facility: CLINIC | Age: 40
End: 2020-11-12
Payer: COMMERCIAL

## 2020-11-12 VITALS
HEART RATE: 99 BPM | DIASTOLIC BLOOD PRESSURE: 79 MMHG | SYSTOLIC BLOOD PRESSURE: 112 MMHG | WEIGHT: 177 LBS | BODY MASS INDEX: 27.72 KG/M2

## 2020-11-12 DIAGNOSIS — O09.523 ADVANCED MATERNAL AGE IN MULTIGRAVIDA, THIRD TRIMESTER: ICD-10-CM

## 2020-11-12 DIAGNOSIS — Z34.03 ENCOUNTER FOR SUPERVISION OF NORMAL FIRST PREGNANCY IN THIRD TRIMESTER: Primary | ICD-10-CM

## 2020-11-12 PROCEDURE — 0502F PR SUBSEQUENT PRENATAL CARE: ICD-10-PCS | Mod: CPTII,S$GLB,, | Performed by: OBSTETRICS & GYNECOLOGY

## 2020-11-12 PROCEDURE — 0502F SUBSEQUENT PRENATAL CARE: CPT | Mod: CPTII,S$GLB,, | Performed by: OBSTETRICS & GYNECOLOGY

## 2020-11-12 NOTE — PROGRESS NOTES
Subjective:       Patient ID: Ammy Zamora is a 40 y.o.  at 33w6d     Chief Complaint:  Routine Prenatal Visit      History of Present Illness  No complaints. Reports normal sx. Labs and history reviewed with pt.         Review of Systems  Denies n/v, f/c, dysuria, contractions,   VD, VB, round ligament pain, headaches, preE ROS       Objective:     Vitals:    20 0803   BP: 112/79   Pulse: 99     Wt Readings from Last 3 Encounters:   20 80.3 kg (177 lb)   10/27/20 78.9 kg (174 lb)   10/07/20 78.5 kg (173 lb)       nad  NCAT  pupils normal size  Skin nml no rashes or lesions  No resp distress, resp even and unlabored  Gravid nt, no rebound no guarding  No cyanosis or clubbing, edema appropriate for pregn    FHT: 140      Assessment:        1. Encounter for supervision of normal first pregnancy in third trimester    2. Advanced maternal age in multigravida, third trimester                Plan:        Encouraged PNV  Pain, fever, bleeding precautions   RTC 2 weeks

## 2020-12-04 ENCOUNTER — ROUTINE PRENATAL (OUTPATIENT)
Dept: OBSTETRICS AND GYNECOLOGY | Facility: CLINIC | Age: 40
End: 2020-12-04
Payer: COMMERCIAL

## 2020-12-04 VITALS
HEART RATE: 101 BPM | DIASTOLIC BLOOD PRESSURE: 79 MMHG | BODY MASS INDEX: 28.19 KG/M2 | SYSTOLIC BLOOD PRESSURE: 111 MMHG | WEIGHT: 180 LBS

## 2020-12-04 DIAGNOSIS — O09.523 ADVANCED MATERNAL AGE IN MULTIGRAVIDA, THIRD TRIMESTER: ICD-10-CM

## 2020-12-04 DIAGNOSIS — Z34.03 ENCOUNTER FOR SUPERVISION OF NORMAL FIRST PREGNANCY IN THIRD TRIMESTER: Primary | ICD-10-CM

## 2020-12-04 PROCEDURE — 0502F PR SUBSEQUENT PRENATAL CARE: ICD-10-PCS | Mod: CPTII,S$GLB,, | Performed by: OBSTETRICS & GYNECOLOGY

## 2020-12-04 PROCEDURE — 0502F SUBSEQUENT PRENATAL CARE: CPT | Mod: CPTII,S$GLB,, | Performed by: OBSTETRICS & GYNECOLOGY

## 2020-12-04 NOTE — PROGRESS NOTES
Subjective:       Patient ID: Ammy Zamora is a 40 y.o.  at 37w0d     Chief Complaint:  Routine Prenatal Visit      History of Present Illness  No complaints. Reports normal sx. Labs and history reviewed with pt.         Review of Systems  Denies n/v, f/c, dysuria, contractions,   VD, VB, round ligament pain, headaches, preE ROS       Objective:     Vitals:    20 0758   BP: 111/79   Pulse: 101     Wt Readings from Last 3 Encounters:   20 81.6 kg (180 lb)   20 80.3 kg (177 lb)   10/27/20 78.9 kg (174 lb)       nad  NCAT  pupils normal size  Skin nml no rashes or lesions  No resp distress, resp even and unlabored  Gravid nt, no rebound no guarding  No cyanosis or clubbing, edema appropriate for pregn    FHT: 150's  CVX: 1 th h    Assessment:        1. Encounter for supervision of normal first pregnancy in third trimester    2. Advanced maternal age in multigravida, third trimester                Plan:         GBS    Encouraged PNV  Pain, fever, bleeding precautions   RTC 1 weeks

## 2020-12-05 LAB
GROUP B STREP MOLECULAR: POSITIVE
PENICILLIN ALLERGIC: ABNORMAL

## 2020-12-10 ENCOUNTER — ROUTINE PRENATAL (OUTPATIENT)
Dept: OBSTETRICS AND GYNECOLOGY | Facility: CLINIC | Age: 40
End: 2020-12-10
Payer: COMMERCIAL

## 2020-12-10 VITALS
SYSTOLIC BLOOD PRESSURE: 117 MMHG | WEIGHT: 179 LBS | HEART RATE: 82 BPM | BODY MASS INDEX: 28.04 KG/M2 | DIASTOLIC BLOOD PRESSURE: 79 MMHG

## 2020-12-10 DIAGNOSIS — O09.523 ADVANCED MATERNAL AGE IN MULTIGRAVIDA, THIRD TRIMESTER: ICD-10-CM

## 2020-12-10 DIAGNOSIS — B95.1 POSITIVE GBS TEST: ICD-10-CM

## 2020-12-10 DIAGNOSIS — Z34.03 ENCOUNTER FOR SUPERVISION OF NORMAL FIRST PREGNANCY IN THIRD TRIMESTER: Primary | ICD-10-CM

## 2020-12-10 PROCEDURE — 0502F PR SUBSEQUENT PRENATAL CARE: ICD-10-PCS | Mod: CPTII,S$GLB,, | Performed by: OBSTETRICS & GYNECOLOGY

## 2020-12-10 PROCEDURE — 0502F SUBSEQUENT PRENATAL CARE: CPT | Mod: CPTII,S$GLB,, | Performed by: OBSTETRICS & GYNECOLOGY

## 2020-12-10 NOTE — PROGRESS NOTES
Subjective:       Patient ID: Ammy Zamora is a 40 y.o.  at 37w6d     Chief Complaint:  Routine Prenatal Visit      History of Present Illness  No complaints. Reports normal sx. Labs and history reviewed with pt.         Review of Systems  Denies n/v, f/c, dysuria, contractions,   VD, VB, round ligament pain, headaches, preE ROS       Objective:     Vitals:    12/10/20 0831   BP: 117/79   Pulse: 82     Wt Readings from Last 3 Encounters:   12/10/20 81.2 kg (179 lb)   20 81.6 kg (180 lb)   20 80.3 kg (177 lb)       nad  NCAT  pupils normal size  Skin nml no rashes or lesions  No resp distress, resp even and unlabored  Gravid nt, no rebound no guarding  No cyanosis or clubbing, edema appropriate for pregn    FHT: 140's  CVX:     Assessment:        1. Encounter for supervision of normal first pregnancy in third trimester    2. Advanced maternal age in multigravida, third trimester    3. Positive GBS test                Plan:        Encouraged PNV  Pain, fever, bleeding precautions   RTC 1 weeks

## 2020-12-16 ENCOUNTER — ROUTINE PRENATAL (OUTPATIENT)
Dept: OBSTETRICS AND GYNECOLOGY | Facility: CLINIC | Age: 40
End: 2020-12-16
Payer: COMMERCIAL

## 2020-12-16 VITALS
HEART RATE: 102 BPM | SYSTOLIC BLOOD PRESSURE: 134 MMHG | BODY MASS INDEX: 28.35 KG/M2 | WEIGHT: 181 LBS | DIASTOLIC BLOOD PRESSURE: 78 MMHG

## 2020-12-16 DIAGNOSIS — B95.1 POSITIVE GBS TEST: ICD-10-CM

## 2020-12-16 DIAGNOSIS — O16.3 ELEVATED BLOOD PRESSURE AFFECTING PREGNANCY IN THIRD TRIMESTER, ANTEPARTUM: ICD-10-CM

## 2020-12-16 DIAGNOSIS — Z34.03 ENCOUNTER FOR SUPERVISION OF NORMAL FIRST PREGNANCY IN THIRD TRIMESTER: Primary | ICD-10-CM

## 2020-12-16 DIAGNOSIS — O09.523 ADVANCED MATERNAL AGE IN MULTIGRAVIDA, THIRD TRIMESTER: ICD-10-CM

## 2020-12-16 PROCEDURE — 76819 PR US, OB, FETAL BIOPHYSICAL, W/O NST: ICD-10-PCS | Mod: S$GLB,,, | Performed by: OBSTETRICS & GYNECOLOGY

## 2020-12-16 PROCEDURE — 0502F PR SUBSEQUENT PRENATAL CARE: ICD-10-PCS | Mod: CPTII,S$GLB,, | Performed by: OBSTETRICS & GYNECOLOGY

## 2020-12-16 PROCEDURE — 0502F SUBSEQUENT PRENATAL CARE: CPT | Mod: CPTII,S$GLB,, | Performed by: OBSTETRICS & GYNECOLOGY

## 2020-12-16 PROCEDURE — 76819 FETAL BIOPHYS PROFIL W/O NST: CPT | Mod: S$GLB,,, | Performed by: OBSTETRICS & GYNECOLOGY

## 2020-12-16 NOTE — PROGRESS NOTES
Subjective:       Patient ID: Ammy Zamora is a 40 y.o.  at 38w5d     Chief Complaint:  Routine Prenatal Visit      History of Present Illness  No complaints. Reports normal sx. Labs and history reviewed with pt.         Review of Systems  Denies n/v, f/c, dysuria, contractions,   VD, VB, round ligament pain, headaches, preE ROS       Objective:     Vitals:    20 1429   BP: 134/78   Pulse:      Wt Readings from Last 3 Encounters:   20 82.1 kg (181 lb)   12/10/20 81.2 kg (179 lb)   20 81.6 kg (180 lb)       nad  NCAT  pupils normal size  Skin nml no rashes or lesions  No resp distress, resp even and unlabored  Gravid nt, no rebound no guarding  No cyanosis or clubbing, edema appropriate for pregn    FHT: 140'a  CVX: 1 th h      bpp 8/8  See     Assessment:        1. Encounter for supervision of normal first pregnancy in third trimester    2. Advanced maternal age in multigravida, third trimester    3. Positive GBS test    4. Elevated blood pressure affecting pregnancy in third trimester, antepartum                Plan:      Encouraged PNV  Pain, fever, bleeding precaution    Induction Monday  bedrest

## 2020-12-21 LAB
ANION GAP SERPL CALC-SCNC: 13 MMOL/L (ref 3–11)
APPEARANCE, UA: CLEAR
BASOPHILS NFR BLD: 0.1 % (ref 0–3)
BILIRUB UR QL STRIP: NEGATIVE MG/DL
BUN SERPL-MCNC: 10 MG/DL (ref 7–18)
BUN/CREAT SERPL: 14.92 RATIO (ref 7–18)
CALCIUM BLD-MCNC: NEGATIVE MG/DL
CALCIUM SERPL-MCNC: 8.8 MG/DL (ref 8.8–10.5)
CHLORIDE SERPL-SCNC: 104 MMOL/L (ref 100–108)
CO2 SERPL-SCNC: 22 MMOL/L (ref 21–32)
COLOR UR: NORMAL
COV PREGNANCY STATUS: NORMAL
COVID-19 AB, IGM: NEGATIVE
CREAT SERPL-MCNC: 0.67 MG/DL (ref 0.55–1.02)
EOSINOPHIL NFR BLD: 0.6 % (ref 1–3)
ERYTHROCYTE [DISTWIDTH] IN BLOOD BY AUTOMATED COUNT: 12.6 % (ref 12.5–18)
GFR ESTIMATION: > 60
GLUCOSE (UA): NORMAL MG/DL
GLUCOSE SERPL-MCNC: 107 MG/DL (ref 70–110)
HCT VFR BLD AUTO: 31.8 % (ref 37–47)
HGB BLD-MCNC: 10.8 G/DL (ref 12–16)
HGB UR QL STRIP: NEGATIVE /UL
KETONES UR QL STRIP: NEGATIVE MG/DL
LEUKOCYTE ESTERASE UR QL STRIP: NEGATIVE /UL
LYMPHOCYTES NFR BLD: 17.8 % (ref 25–40)
MCH RBC QN AUTO: 31.6 PG (ref 27–31.2)
MCHC RBC AUTO-ENTMCNC: 34 G/DL (ref 31.8–35.4)
MCV RBC AUTO: 93 FL (ref 80–97)
MONOCYTES NFR BLD: 4.3 % (ref 1–15)
NEUTROPHILS # BLD AUTO: 5.57 10*3/UL (ref 1.8–7.7)
NEUTROPHILS NFR BLD: 76.8 % (ref 37–80)
NITRITE UR QL STRIP: NEGATIVE
NUCLEATED RED BLOOD CELLS: 0 %
PATIENT EMPLOYED IN HEALTHCARE: NO
PATIENT HAS SYMPTOMS FOR CONDITION OF INTEREST: NO
PATIENT HOSPITALIZED BC COND: NO
PATIENT IN CONGREGATE CARE: NO
PH UR STRIP: 6 PH (ref 5–9)
PLATELETS: 162 10*3/UL (ref 142–424)
POTASSIUM SERPL-SCNC: 3.8 MMOL/L (ref 3.6–5.2)
PROT UR QL STRIP: NEGATIVE MG/DL
RBC # BLD AUTO: 3.42 10*6/UL (ref 4.2–5.4)
RPR: NON REACTIVE
SODIUM BLD-SCNC: 139 MMOL/L (ref 135–145)
SP GR UR STRIP: 1.01 (ref 1–1.03)
SPECIMEN COLLECTION METHOD, URINE: NORMAL
UROBILINOGEN UR STRIP-ACNC: NORMAL MG/DL
WBC # BLD: 7.3 10*3/UL (ref 4.6–10.2)

## 2020-12-22 ENCOUNTER — OUTSIDE PLACE OF SERVICE (OUTPATIENT)
Dept: OBSTETRICS AND GYNECOLOGY | Facility: CLINIC | Age: 40
End: 2020-12-22
Payer: COMMERCIAL

## 2020-12-22 PROCEDURE — 59510 PR FULL ROUT OBSTE CARE,CESAREAN DELIV: ICD-10-PCS | Mod: GB,,, | Performed by: OBSTETRICS & GYNECOLOGY

## 2020-12-22 PROCEDURE — 59510 CESAREAN DELIVERY: CPT | Mod: GB,,, | Performed by: OBSTETRICS & GYNECOLOGY

## 2020-12-23 LAB
ERYTHROCYTE [DISTWIDTH] IN BLOOD BY AUTOMATED COUNT: 12.7 % (ref 12.5–18)
HCT VFR BLD AUTO: 31.6 % (ref 37–47)
HGB BLD-MCNC: 10.4 G/DL (ref 12–16)
MCH RBC QN AUTO: 31.3 PG (ref 27–31.2)
MCHC RBC AUTO-ENTMCNC: 32.9 G/DL (ref 31.8–35.4)
MCV RBC AUTO: 95.2 FL (ref 80–97)
NUCLEATED RED BLOOD CELLS: 0 %
PLATELETS: 168 10*3/UL (ref 142–424)
RBC # BLD AUTO: 3.32 10*6/UL (ref 4.2–5.4)
WBC # BLD: 10.4 10*3/UL (ref 4.6–10.2)

## 2021-01-05 ENCOUNTER — POSTPARTUM VISIT (OUTPATIENT)
Dept: OBSTETRICS AND GYNECOLOGY | Facility: CLINIC | Age: 41
End: 2021-01-05
Payer: COMMERCIAL

## 2021-01-05 VITALS
DIASTOLIC BLOOD PRESSURE: 79 MMHG | BODY MASS INDEX: 25.69 KG/M2 | WEIGHT: 164 LBS | SYSTOLIC BLOOD PRESSURE: 115 MMHG | HEART RATE: 84 BPM

## 2021-01-05 PROCEDURE — 99499 NO LOS: ICD-10-PCS | Mod: S$GLB,,, | Performed by: OBSTETRICS & GYNECOLOGY

## 2021-01-05 PROCEDURE — 99499 UNLISTED E&M SERVICE: CPT | Mod: S$GLB,,, | Performed by: OBSTETRICS & GYNECOLOGY

## 2021-02-02 ENCOUNTER — OFFICE VISIT (OUTPATIENT)
Dept: OBSTETRICS AND GYNECOLOGY | Facility: CLINIC | Age: 41
End: 2021-02-02
Payer: COMMERCIAL

## 2021-02-02 PROCEDURE — 0503F PR POSTPARTUM CARE VISIT: ICD-10-PCS | Mod: ,,, | Performed by: OBSTETRICS & GYNECOLOGY

## 2021-02-02 PROCEDURE — 0503F POSTPARTUM CARE VISIT: CPT | Mod: ,,, | Performed by: OBSTETRICS & GYNECOLOGY

## 2021-05-10 ENCOUNTER — OFFICE VISIT (OUTPATIENT)
Dept: FAMILY MEDICINE | Facility: CLINIC | Age: 41
End: 2021-05-10
Payer: COMMERCIAL

## 2021-05-10 VITALS
OXYGEN SATURATION: 99 % | TEMPERATURE: 98 F | SYSTOLIC BLOOD PRESSURE: 110 MMHG | DIASTOLIC BLOOD PRESSURE: 67 MMHG | HEART RATE: 70 BPM | BODY MASS INDEX: 25.27 KG/M2 | HEIGHT: 67 IN | WEIGHT: 161 LBS

## 2021-05-10 DIAGNOSIS — Z00.00 PREVENTATIVE HEALTH CARE: Primary | ICD-10-CM

## 2021-05-10 DIAGNOSIS — L65.9 ALOPECIA: ICD-10-CM

## 2021-05-10 PROCEDURE — 3008F PR BODY MASS INDEX (BMI) DOCUMENTED: ICD-10-PCS | Mod: CPTII,S$GLB,, | Performed by: FAMILY MEDICINE

## 2021-05-10 PROCEDURE — 99395 PREV VISIT EST AGE 18-39: CPT | Mod: S$GLB,,, | Performed by: FAMILY MEDICINE

## 2021-05-10 PROCEDURE — 1126F AMNT PAIN NOTED NONE PRSNT: CPT | Mod: S$GLB,,, | Performed by: FAMILY MEDICINE

## 2021-05-10 PROCEDURE — 3008F BODY MASS INDEX DOCD: CPT | Mod: CPTII,S$GLB,, | Performed by: FAMILY MEDICINE

## 2021-05-10 PROCEDURE — 1126F PR PAIN SEVERITY QUANTIFIED, NO PAIN PRESENT: ICD-10-PCS | Mod: S$GLB,,, | Performed by: FAMILY MEDICINE

## 2021-05-10 PROCEDURE — 99395 PR PREVENTIVE VISIT,EST,18-39: ICD-10-PCS | Mod: S$GLB,,, | Performed by: FAMILY MEDICINE

## 2021-05-14 LAB
ABS NRBC COUNT: 0 X 10 3/UL (ref 0–0.01)
ABSOLUTE BASOPHIL: 0.02 X 10 3/UL (ref 0–0.22)
ABSOLUTE EOSINOPHIL: 0.08 X 10 3/UL (ref 0.04–0.54)
ABSOLUTE IMMATURE GRAN: 0.01 X 10 3/UL (ref 0–0.04)
ABSOLUTE LYMPHOCYTE: 1.63 X 10 3/UL (ref 0.86–4.75)
ABSOLUTE MONOCYTE: 0.29 X 10 3/UL (ref 0.22–1.08)
ALBUMIN SERPL-MCNC: 5 G/DL (ref 3.5–5.2)
ALBUMIN/GLOB SERPL ELPH: 1.9 {RATIO} (ref 1–2.7)
ALP ISOS SERPL LEV INH-CCNC: 71 U/L (ref 35–105)
ALT (SGPT): 15 U/L (ref 0–33)
ANION GAP SERPL CALC-SCNC: 8 MMOL/L (ref 8–17)
AST SERPL-CCNC: 20 U/L (ref 0–32)
BASOPHILS NFR BLD: 0.4 % (ref 0.2–1.2)
BILIRUBIN, TOTAL: 0.39 MG/DL (ref 0–1.2)
BUN/CREAT SERPL: 16.7 (ref 6–20)
CALCIUM SERPL-MCNC: 10 MG/DL (ref 8.6–10.2)
CARBON DIOXIDE, CO2: 28 MMOL/L (ref 22–29)
CHLORIDE: 104 MMOL/L (ref 98–107)
CHOLEST SERPL-MSCNC: 179 MG/DL (ref 100–200)
CREAT SERPL-MCNC: 0.86 MG/DL (ref 0.5–0.9)
EOSINOPHIL NFR BLD: 1.7 % (ref 0.7–7)
GFR ESTIMATION: 73.46
GLOBULIN: 2.6 G/DL (ref 1.5–4.5)
GLUCOSE: 85 MG/DL (ref 74–106)
HCT VFR BLD AUTO: 41.4 % (ref 37–47)
HDLC SERPL-MCNC: 80 MG/DL
HGB BLD-MCNC: 13.3 G/DL (ref 12–16)
IMMATURE GRANULOCYTES: 0.2 % (ref 0–0.5)
LDL/HDL RATIO: 1.1 (ref 1–3)
LDLC SERPL CALC-MCNC: 90.8 MG/DL (ref 0–100)
LYMPHOCYTES NFR BLD: 35.2 % (ref 19.3–53.1)
MCH RBC QN AUTO: 29.5 PG (ref 27–32)
MCHC RBC AUTO-ENTMCNC: 32.1 G/DL (ref 32–36)
MCV RBC AUTO: 91.8 FL (ref 82–100)
MONOCYTES NFR BLD: 6.3 % (ref 4.7–12.5)
NEUTROPHILS # BLD AUTO: 2.6 X 10 3/UL (ref 2.15–7.56)
NEUTROPHILS NFR BLD: 56.2 %
NUCLEATED RED BLOOD CELLS: 0 /100 WBC (ref 0–0.2)
PLATELET # BLD AUTO: 226 X 10 3/UL (ref 135–400)
POTASSIUM: 4 MMOL/L (ref 3.5–5.1)
PROT SNV-MCNC: 7.6 G/DL (ref 6.4–8.3)
RBC # BLD AUTO: 4.51 X 10 6/UL (ref 4.2–5.4)
RDW-SD: 42.1 FL (ref 37–54)
SODIUM: 140 MMOL/L (ref 136–145)
T4, FREE: 1.38 NG/DL (ref 0.93–1.7)
TRIGL SERPL-MCNC: 41 MG/DL (ref 0–150)
TSH SERPL DL<=0.005 MIU/L-ACNC: 1.91 UIU/ML (ref 0.27–4.2)
UREA NITROGEN (BUN): 14.4 MG/DL (ref 6–20)
WBC # BLD: 4.63 X 10 3/UL (ref 4.3–10.8)

## 2021-05-24 DIAGNOSIS — M25.362 KNEE INSTABILITY, LEFT: Primary | ICD-10-CM

## 2021-06-01 ENCOUNTER — OFFICE VISIT (OUTPATIENT)
Dept: FAMILY MEDICINE | Facility: CLINIC | Age: 41
End: 2021-06-01
Payer: COMMERCIAL

## 2021-06-01 VITALS
OXYGEN SATURATION: 100 % | DIASTOLIC BLOOD PRESSURE: 69 MMHG | RESPIRATION RATE: 16 BRPM | HEART RATE: 70 BPM | BODY MASS INDEX: 25.11 KG/M2 | SYSTOLIC BLOOD PRESSURE: 109 MMHG | HEIGHT: 67 IN | WEIGHT: 160 LBS

## 2021-06-01 DIAGNOSIS — M25.562 ANTERIOR KNEE PAIN, LEFT: ICD-10-CM

## 2021-06-01 DIAGNOSIS — M25.462 SWELLING OF KNEE JOINT, LEFT: ICD-10-CM

## 2021-06-01 DIAGNOSIS — M25.362 KNEE INSTABILITY, LEFT: Primary | ICD-10-CM

## 2021-06-01 DIAGNOSIS — M23.52 CHRONIC INSTABILITY OF LEFT KNEE: ICD-10-CM

## 2021-06-01 PROCEDURE — 3008F PR BODY MASS INDEX (BMI) DOCUMENTED: ICD-10-PCS | Mod: CPTII,S$GLB,, | Performed by: NURSE PRACTITIONER

## 2021-06-01 PROCEDURE — 99214 OFFICE O/P EST MOD 30 MIN: CPT | Mod: S$GLB,,, | Performed by: NURSE PRACTITIONER

## 2021-06-01 PROCEDURE — 1126F PR PAIN SEVERITY QUANTIFIED, NO PAIN PRESENT: ICD-10-PCS | Mod: S$GLB,,, | Performed by: NURSE PRACTITIONER

## 2021-06-01 PROCEDURE — 99214 PR OFFICE/OUTPT VISIT, EST, LEVL IV, 30-39 MIN: ICD-10-PCS | Mod: S$GLB,,, | Performed by: NURSE PRACTITIONER

## 2021-06-01 PROCEDURE — 3008F BODY MASS INDEX DOCD: CPT | Mod: CPTII,S$GLB,, | Performed by: NURSE PRACTITIONER

## 2021-06-01 PROCEDURE — 1126F AMNT PAIN NOTED NONE PRSNT: CPT | Mod: S$GLB,,, | Performed by: NURSE PRACTITIONER
